# Patient Record
Sex: FEMALE | Race: WHITE | NOT HISPANIC OR LATINO | Employment: FULL TIME | URBAN - METROPOLITAN AREA
[De-identification: names, ages, dates, MRNs, and addresses within clinical notes are randomized per-mention and may not be internally consistent; named-entity substitution may affect disease eponyms.]

---

## 2020-02-17 ENCOUNTER — OFFICE VISIT (OUTPATIENT)
Dept: OBSTETRICS AND GYNECOLOGY | Facility: CLINIC | Age: 30
End: 2020-02-17
Payer: COMMERCIAL

## 2020-02-17 ENCOUNTER — PROCEDURE VISIT (OUTPATIENT)
Dept: OBSTETRICS AND GYNECOLOGY | Facility: CLINIC | Age: 30
End: 2020-02-17
Payer: COMMERCIAL

## 2020-02-17 ENCOUNTER — LAB VISIT (OUTPATIENT)
Dept: LAB | Facility: HOSPITAL | Age: 30
End: 2020-02-17
Attending: OBSTETRICS & GYNECOLOGY
Payer: COMMERCIAL

## 2020-02-17 VITALS — DIASTOLIC BLOOD PRESSURE: 90 MMHG | WEIGHT: 226.88 LBS | SYSTOLIC BLOOD PRESSURE: 130 MMHG

## 2020-02-17 DIAGNOSIS — E28.2 PCOS (POLYCYSTIC OVARIAN SYNDROME): ICD-10-CM

## 2020-02-17 DIAGNOSIS — L68.0 HIRSUTISM: Primary | ICD-10-CM

## 2020-02-17 DIAGNOSIS — L68.0 HIRSUTISM: ICD-10-CM

## 2020-02-17 LAB
DHEA-S SERPL-MCNC: 431.8 UG/DL (ref 95.8–511.7)
PROLACTIN SERPL IA-MCNC: 9.8 NG/ML (ref 5.2–26.5)
TESTOST SERPL-MCNC: 26 NG/DL (ref 5–73)

## 2020-02-17 PROCEDURE — 99999 PR PBB SHADOW E&M-NEW PATIENT-LVL III: CPT | Mod: PBBFAC,,, | Performed by: OBSTETRICS & GYNECOLOGY

## 2020-02-17 PROCEDURE — 76830 PR  ECHOGRAPHY,TRANSVAGINAL: ICD-10-PCS | Mod: S$GLB,,, | Performed by: OBSTETRICS & GYNECOLOGY

## 2020-02-17 PROCEDURE — 76830 TRANSVAGINAL US NON-OB: CPT | Mod: S$GLB,,, | Performed by: OBSTETRICS & GYNECOLOGY

## 2020-02-17 PROCEDURE — 84402 ASSAY OF FREE TESTOSTERONE: CPT

## 2020-02-17 PROCEDURE — 82627 DEHYDROEPIANDROSTERONE: CPT

## 2020-02-17 PROCEDURE — 99999 PR PBB SHADOW E&M-NEW PATIENT-LVL III: ICD-10-PCS | Mod: PBBFAC,,, | Performed by: OBSTETRICS & GYNECOLOGY

## 2020-02-17 PROCEDURE — 99203 PR OFFICE/OUTPT VISIT, NEW, LEVL III, 30-44 MIN: ICD-10-PCS | Mod: 25,S$GLB,, | Performed by: OBSTETRICS & GYNECOLOGY

## 2020-02-17 PROCEDURE — 84403 ASSAY OF TOTAL TESTOSTERONE: CPT

## 2020-02-17 PROCEDURE — 84146 ASSAY OF PROLACTIN: CPT

## 2020-02-17 PROCEDURE — 36415 COLL VENOUS BLD VENIPUNCTURE: CPT

## 2020-02-17 PROCEDURE — 99203 OFFICE O/P NEW LOW 30 MIN: CPT | Mod: 25,S$GLB,, | Performed by: OBSTETRICS & GYNECOLOGY

## 2020-02-17 RX ORDER — NORGESTIMATE AND ETHINYL ESTRADIOL 7DAYSX3 28
KIT ORAL
COMMUNITY
Start: 2020-02-08

## 2020-02-17 RX ORDER — SPIRONOLACTONE 50 MG/1
TABLET, FILM COATED ORAL
COMMUNITY
Start: 2019-12-17

## 2020-02-17 NOTE — PROGRESS NOTES
30 yo female who presents for second opinion.  [facility]  Was told that she has PCOS based on h/o excessive hair growth and elevated testosterone levels.  Patient was started on OCPs and spirnolactone at that time.  She reports that her prolactin levels were also elevated at 46.  Was told that she would need to f/u to have repeat prolactin to see if she had a prolactinoma.  Patient presents today for second opinion.    Repots menarche at 16 yrs old.  Reports that her cycles came q month.  She was put on OCPs at age 18 yrs old.  She stopped them in Nov 2013.  Reports that cycles have been reg since that time.  She, is, however, concerned about extra hair growth on her face.  She is also concerned that her new medications may be causing fatigue and tiredness.    Patient is s/p pelvic US today in my clinic. Shows normal pelvic US. No polycystic ovaries noted.  She will have repeat labs today.  Prolactin and testosterone testing.    niles toussaint MD

## 2020-02-20 LAB — TESTOST FREE SERPL-MCNC: 1 PG/ML
